# Patient Record
Sex: FEMALE | Race: WHITE | URBAN - METROPOLITAN AREA
[De-identification: names, ages, dates, MRNs, and addresses within clinical notes are randomized per-mention and may not be internally consistent; named-entity substitution may affect disease eponyms.]

---

## 2020-08-04 ENCOUNTER — OFFICE (OUTPATIENT)
Dept: URBAN - METROPOLITAN AREA CLINIC 45 | Facility: CLINIC | Age: 80
End: 2020-08-04

## 2020-08-04 VITALS — HEIGHT: 66 IN | WEIGHT: 145 LBS

## 2020-08-04 DIAGNOSIS — M75.50 BURSITIS OF UNSPECIFIED SHOULDER: ICD-10-CM

## 2020-08-04 DIAGNOSIS — Z86.010 SURVEILLANCE DUE TO PRIOR COLONIC NEOPLASIA: ICD-10-CM

## 2020-08-04 DIAGNOSIS — R14.0 ABDOMINAL BLOATING AND/OR GAS: ICD-10-CM

## 2020-08-04 PROCEDURE — 99213 OFFICE O/P EST LOW 20 MIN: CPT | Performed by: INTERNAL MEDICINE

## 2020-08-04 PROCEDURE — G0406 INPT/TELE FOLLOW UP 15: HCPCS | Performed by: INTERNAL MEDICINE

## 2020-08-04 NOTE — SERVICEHPINOTES
The patient presents for telemedicine visit to discuss surveillance colonoscopy. Patient occasionally experiences heartburn and bloating suggestive of excessive intestinal gas. Bloating/gas is relieved by passing flatus and relaxation techniques. Alarm symptoms reported: none..There has been 2 previous colon screenings, most recently in August 2015, previously in early 2012, adenomatous polyps were found and removed. The patient has no family history of colon cancer or other significant GI diseases. Patient denies fever, nausea, vomiting, dysphagia, reflux, abdominal pain, rectal bleeding, melena, and significant change in weight. Denies shortness of breath and chest pain.

## 2020-08-07 ENCOUNTER — AMBULATORY SURGICAL CENTER (OUTPATIENT)
Dept: URBAN - METROPOLITAN AREA SURGERY 28 | Facility: SURGERY | Age: 80
End: 2020-08-07

## 2020-08-07 VITALS
RESPIRATION RATE: 17 BRPM | DIASTOLIC BLOOD PRESSURE: 79 MMHG | OXYGEN SATURATION: 98 % | HEIGHT: 66 IN | WEIGHT: 145 LBS | SYSTOLIC BLOOD PRESSURE: 136 MMHG | HEART RATE: 80 BPM | TEMPERATURE: 97.7 F

## 2020-08-07 DIAGNOSIS — K57.30 DVRTCLOS OF LG INT W/O PERFORATION OR ABSCESS W/O BLEEDING: ICD-10-CM

## 2020-08-07 DIAGNOSIS — R14.0 ABDOMINAL DISTENSION (GASEOUS): ICD-10-CM

## 2020-08-07 PROBLEM — R10.30 ABDOMINAL PAIN, OTHER OR MULTIPLE SITES: Status: ACTIVE | Noted: 2020-08-07

## 2020-08-07 PROCEDURE — 45378 DIAGNOSTIC COLONOSCOPY: CPT | Performed by: INTERNAL MEDICINE

## 2020-08-07 NOTE — SERVICEHPINOTES
The patient presents for surveillance colonoscopy. Patient occasionally experiences heartburn, abdominal pain and bloating suggestive of excessive intestinal gas. There has been 2 previous colon screenings, most recently in August 2015, previously in early 2012, adenomatous polyps were found and removed. The patient has no family history of colon cancer or other significant GI diseases. Patient denies fever, nausea, vomiting, dysphagia, reflux, abdominal pain, rectal bleeding, melena, and significant change in weight. Denies shortness of breath and chest pain.

## 2020-08-11 ENCOUNTER — OFFICE (OUTPATIENT)
Dept: URBAN - METROPOLITAN AREA CLINIC 45 | Facility: CLINIC | Age: 80
End: 2020-08-11

## 2020-08-11 VITALS — HEIGHT: 66 IN | WEIGHT: 144 LBS

## 2020-08-11 DIAGNOSIS — K57.30 DVRTCLOS OF LG INT W/O PERFORATION OR ABSCESS W/O BLEEDING: ICD-10-CM

## 2020-08-11 DIAGNOSIS — Z86.010 SURVEILLANCE DUE TO PRIOR COLONIC NEOPLASIA: ICD-10-CM

## 2020-08-11 PROCEDURE — 99212 OFFICE O/P EST SF 10 MIN: CPT | Performed by: INTERNAL MEDICINE

## 2020-08-11 PROCEDURE — G0406 INPT/TELE FOLLOW UP 15: HCPCS | Performed by: INTERNAL MEDICINE

## 2020-08-11 NOTE — SERVICEHPINOTES
The patient presents for telemedicine follow-up after recent surveillance colonoscopy. Patient occasionally experiences heartburn, abdominal pain and bloating suggestive of excessive intestinal gas. Colonoscopy demonstrated mild sigmoid colon diverticulosis, otherwise was unremarkable. There has been 2 previous colon screenings, most recently in August 2015, previously in early 2012, adenomatous polyps were found and removed. The patient has no family history of colon cancer or other significant GI diseases. Patient denies fever, nausea, vomiting, dysphagia, reflux, abdominal pain, rectal bleeding, melena, and significant change in weight. Denies shortness of breath and chest pain.

## 2021-11-18 ENCOUNTER — HOSPITAL ENCOUNTER (OUTPATIENT)
Dept: HOSPITAL 54 - MSC | Age: 81
Discharge: HOME | End: 2021-11-18
Attending: INTERNAL MEDICINE
Payer: MEDICARE

## 2021-11-18 DIAGNOSIS — M25.512: ICD-10-CM

## 2021-11-18 DIAGNOSIS — E55.9: ICD-10-CM

## 2021-11-18 DIAGNOSIS — H26.9: ICD-10-CM

## 2021-11-18 DIAGNOSIS — A04.8: ICD-10-CM

## 2021-11-18 DIAGNOSIS — T75.3XXA: ICD-10-CM

## 2021-11-18 DIAGNOSIS — E78.5: ICD-10-CM

## 2021-11-18 DIAGNOSIS — K21.9: ICD-10-CM

## 2021-11-18 DIAGNOSIS — Z01.818: Primary | ICD-10-CM

## 2021-11-18 DIAGNOSIS — Z86.19: ICD-10-CM

## 2021-11-18 DIAGNOSIS — M67.439: ICD-10-CM

## 2021-11-18 DIAGNOSIS — M85.80: ICD-10-CM

## 2021-11-18 DIAGNOSIS — M72.2: ICD-10-CM

## 2021-11-18 LAB
ALBUMIN SERPL BCP-MCNC: 3.8 G/DL (ref 3.4–5)
ALP SERPL-CCNC: 113 U/L (ref 46–116)
ALT SERPL W P-5'-P-CCNC: 31 U/L (ref 12–78)
AST SERPL W P-5'-P-CCNC: 23 U/L (ref 15–37)
BASOPHILS # BLD AUTO: 0 K/UL (ref 0–0.2)
BASOPHILS NFR BLD AUTO: 0.8 % (ref 0–2)
BILIRUB SERPL-MCNC: 0.6 MG/DL (ref 0.2–1)
BILIRUB UR QL STRIP: NEGATIVE
BUN SERPL-MCNC: 20 MG/DL (ref 7–18)
CALCIUM SERPL-MCNC: 9.3 MG/DL (ref 8.5–10.1)
CHLORIDE SERPL-SCNC: 107 MMOL/L (ref 98–107)
CHOLEST SERPL-MCNC: 173 MG/DL (ref ?–200)
CO2 SERPL-SCNC: 28 MMOL/L (ref 21–32)
COLOR UR: YELLOW
CREAT SERPL-MCNC: 0.8 MG/DL (ref 0.6–1.3)
CRP SERPL-MCNC: < 0.2 MG/DL (ref 0–0.9)
EOSINOPHIL NFR BLD AUTO: 5 % (ref 0–6)
GLUCOSE SERPL-MCNC: 87 MG/DL (ref 74–106)
GLUCOSE UR STRIP-MCNC: NEGATIVE MG/DL
HCT VFR BLD AUTO: 44 % (ref 33–45)
HDLC SERPL-MCNC: 74 MG/DL (ref 40–60)
HGB BLD-MCNC: 14.7 G/DL (ref 11.5–14.8)
LDLC SERPL DIRECT ASSAY-MCNC: 79 MG/DL (ref 0–99)
LEUKOCYTE ESTERASE UR QL STRIP: NEGATIVE
LYMPHOCYTES NFR BLD AUTO: 2 K/UL (ref 0.8–4.8)
LYMPHOCYTES NFR BLD AUTO: 36 % (ref 20–44)
MAGNESIUM SERPL-MCNC: 2.4 MG/DL (ref 1.8–2.4)
MCHC RBC AUTO-ENTMCNC: 34 G/DL (ref 31–36)
MCV RBC AUTO: 92 FL (ref 82–100)
MONOCYTES NFR BLD AUTO: 0.5 K/UL (ref 0.1–1.3)
MONOCYTES NFR BLD AUTO: 8.9 % (ref 2–12)
NEUTROPHILS # BLD AUTO: 2.8 K/UL (ref 1.8–8.9)
NEUTROPHILS NFR BLD AUTO: 49.3 % (ref 43–81)
NITRITE UR QL STRIP: NEGATIVE
PH UR STRIP: 5.5 [PH] (ref 5–8)
PHOSPHATE SERPL-MCNC: 3.4 MG/DL (ref 2.5–4.9)
PLATELET # BLD AUTO: 220 K/UL (ref 150–450)
POTASSIUM SERPL-SCNC: 4.3 MMOL/L (ref 3.5–5.1)
PROT SERPL-MCNC: 7.8 G/DL (ref 6.4–8.2)
PROT UR QL STRIP: NEGATIVE MG/DL
PROT UR-MCNC: 7.8 MG/DL (ref 0–11.9)
RBC # BLD AUTO: 4.74 MIL/UL (ref 4–5.2)
RBC #/AREA URNS HPF: (no result) /HPF (ref 0–2)
SODIUM SERPL-SCNC: 144 MMOL/L (ref 136–145)
TRIGL SERPL-MCNC: 99 MG/DL (ref 30–150)
TSH SERPL DL<=0.005 MIU/L-ACNC: 2.18 UIU/ML (ref 0.36–3.74)
UROBILINOGEN UR STRIP-MCNC: 0.2 EU/DL
WBC #/AREA URNS HPF: (no result) /HPF (ref 0–3)
WBC NRBC COR # BLD AUTO: 5.6 K/UL (ref 4.3–11)

## 2021-11-18 PROCEDURE — 82607 VITAMIN B-12: CPT

## 2021-11-18 PROCEDURE — 84100 ASSAY OF PHOSPHORUS: CPT

## 2021-11-18 PROCEDURE — 82570 ASSAY OF URINE CREATININE: CPT

## 2021-11-18 PROCEDURE — 85652 RBC SED RATE AUTOMATED: CPT

## 2021-11-18 PROCEDURE — 82043 UR ALBUMIN QUANTITATIVE: CPT

## 2021-11-18 PROCEDURE — 86140 C-REACTIVE PROTEIN: CPT

## 2021-11-18 PROCEDURE — 83036 HEMOGLOBIN GLYCOSYLATED A1C: CPT

## 2021-11-18 PROCEDURE — 80053 COMPREHEN METABOLIC PANEL: CPT

## 2021-11-18 PROCEDURE — 93005 ELECTROCARDIOGRAM TRACING: CPT

## 2021-11-18 PROCEDURE — 85025 COMPLETE CBC W/AUTO DIFF WBC: CPT

## 2021-11-18 PROCEDURE — 84443 ASSAY THYROID STIM HORMONE: CPT

## 2021-11-18 PROCEDURE — 80061 LIPID PANEL: CPT

## 2021-11-18 PROCEDURE — 82746 ASSAY OF FOLIC ACID SERUM: CPT

## 2021-11-18 PROCEDURE — 36415 COLL VENOUS BLD VENIPUNCTURE: CPT

## 2021-11-18 PROCEDURE — 84439 ASSAY OF FREE THYROXINE: CPT

## 2021-11-18 PROCEDURE — 81001 URINALYSIS AUTO W/SCOPE: CPT

## 2021-11-18 PROCEDURE — 84155 ASSAY OF PROTEIN SERUM: CPT

## 2021-11-18 PROCEDURE — 83735 ASSAY OF MAGNESIUM: CPT

## 2021-11-23 ENCOUNTER — HOSPITAL ENCOUNTER (OUTPATIENT)
Dept: HOSPITAL 54 - MSC | Age: 81
Discharge: HOME | End: 2021-11-23
Attending: INTERNAL MEDICINE
Payer: MEDICARE

## 2021-11-23 DIAGNOSIS — M72.2: ICD-10-CM

## 2021-11-23 DIAGNOSIS — E78.5: ICD-10-CM

## 2021-11-23 DIAGNOSIS — E55.9: ICD-10-CM

## 2021-11-23 DIAGNOSIS — K21.9: ICD-10-CM

## 2021-11-23 DIAGNOSIS — M85.80: ICD-10-CM

## 2021-11-23 DIAGNOSIS — A04.8: ICD-10-CM

## 2021-11-23 DIAGNOSIS — H26.9: Primary | ICD-10-CM

## 2021-11-23 DIAGNOSIS — M67.439: ICD-10-CM

## 2021-11-23 DIAGNOSIS — M25.512: ICD-10-CM

## 2021-11-23 DIAGNOSIS — Z86.19: ICD-10-CM

## 2021-11-23 DIAGNOSIS — T75.3XXD: ICD-10-CM

## 2022-02-25 ENCOUNTER — HOSPITAL ENCOUNTER (OUTPATIENT)
Dept: HOSPITAL 54 - LAB | Age: 82
Discharge: HOME | End: 2022-02-25
Attending: INTERNAL MEDICINE
Payer: MEDICARE

## 2022-02-25 DIAGNOSIS — I44.4: ICD-10-CM

## 2022-02-25 DIAGNOSIS — Z01.818: Primary | ICD-10-CM

## 2022-02-25 DIAGNOSIS — M47.814: ICD-10-CM

## 2022-02-25 LAB
BASOPHILS # BLD AUTO: 0 K/UL (ref 0–0.2)
BASOPHILS NFR BLD AUTO: 0.1 % (ref 0–2)
EOSINOPHIL NFR BLD AUTO: 7.4 % (ref 0–6)
HCT VFR BLD AUTO: 41 % (ref 33–45)
HGB BLD-MCNC: 13.7 G/DL (ref 11.5–14.8)
LYMPHOCYTES NFR BLD AUTO: 1.8 K/UL (ref 0.8–4.8)
LYMPHOCYTES NFR BLD AUTO: 37.6 % (ref 20–44)
MCHC RBC AUTO-ENTMCNC: 33 G/DL (ref 31–36)
MCV RBC AUTO: 89 FL (ref 82–100)
MONOCYTES NFR BLD AUTO: 0.5 K/UL (ref 0.1–1.3)
MONOCYTES NFR BLD AUTO: 11.6 % (ref 2–12)
NEUTROPHILS # BLD AUTO: 2.1 K/UL (ref 1.8–8.9)
NEUTROPHILS NFR BLD AUTO: 43.3 % (ref 43–81)
PLATELET # BLD AUTO: 208 K/UL (ref 150–450)
RBC # BLD AUTO: 4.63 MIL/UL (ref 4–5.2)
WBC NRBC COR # BLD AUTO: 4.7 K/UL (ref 4.3–11)

## 2022-03-03 ENCOUNTER — HOSPITAL ENCOUNTER (OUTPATIENT)
Dept: HOSPITAL 54 - MSC | Age: 82
Discharge: HOME | End: 2022-03-03
Attending: INTERNAL MEDICINE
Payer: MEDICARE

## 2022-03-03 DIAGNOSIS — M67.439: ICD-10-CM

## 2022-03-03 DIAGNOSIS — Z86.19: ICD-10-CM

## 2022-03-03 DIAGNOSIS — Z01.818: Primary | ICD-10-CM

## 2022-03-03 DIAGNOSIS — M72.2: ICD-10-CM

## 2022-03-03 DIAGNOSIS — M85.80: ICD-10-CM

## 2022-03-03 DIAGNOSIS — E55.9: ICD-10-CM

## 2022-03-03 DIAGNOSIS — K21.9: ICD-10-CM

## 2022-03-03 DIAGNOSIS — H26.9: ICD-10-CM

## 2022-03-03 DIAGNOSIS — T75.3XXD: ICD-10-CM

## 2022-03-03 DIAGNOSIS — A04.8: ICD-10-CM

## 2022-03-03 DIAGNOSIS — E78.5: ICD-10-CM

## 2022-03-03 DIAGNOSIS — M25.512: ICD-10-CM

## 2022-06-15 ENCOUNTER — HOSPITAL ENCOUNTER (OUTPATIENT)
Dept: HOSPITAL 54 - MSC | Age: 82
Discharge: HOME | End: 2022-06-15
Attending: INTERNAL MEDICINE
Payer: MEDICARE

## 2022-06-15 DIAGNOSIS — E78.5: ICD-10-CM

## 2022-06-15 DIAGNOSIS — A04.8: ICD-10-CM

## 2022-06-15 DIAGNOSIS — M67.439: ICD-10-CM

## 2022-06-15 DIAGNOSIS — K21.9: ICD-10-CM

## 2022-06-15 DIAGNOSIS — M25.512: ICD-10-CM

## 2022-06-15 DIAGNOSIS — M72.2: ICD-10-CM

## 2022-06-15 DIAGNOSIS — A91: ICD-10-CM

## 2022-06-15 DIAGNOSIS — M81.0: Primary | ICD-10-CM

## 2022-06-15 DIAGNOSIS — T75.3XXD: ICD-10-CM

## 2022-06-15 DIAGNOSIS — E55.9: ICD-10-CM

## 2022-06-15 DIAGNOSIS — M85.80: ICD-10-CM

## 2023-02-23 ENCOUNTER — HOSPITAL ENCOUNTER (OUTPATIENT)
Dept: HOSPITAL 54 - MSC | Age: 83
Discharge: HOME | End: 2023-02-23
Attending: INTERNAL MEDICINE
Payer: MEDICARE

## 2023-02-23 DIAGNOSIS — M25.512: ICD-10-CM

## 2023-02-23 DIAGNOSIS — M25.559: ICD-10-CM

## 2023-02-23 DIAGNOSIS — M72.2: Primary | ICD-10-CM

## 2023-02-23 DIAGNOSIS — Z79.899: ICD-10-CM

## 2023-02-23 DIAGNOSIS — M67.439: ICD-10-CM

## 2023-02-23 DIAGNOSIS — E78.5: ICD-10-CM

## 2023-02-23 DIAGNOSIS — M81.0: ICD-10-CM

## 2023-02-23 DIAGNOSIS — A04.8: ICD-10-CM

## 2023-02-23 DIAGNOSIS — E55.9: ICD-10-CM

## 2023-02-23 DIAGNOSIS — A91: ICD-10-CM

## 2023-02-23 DIAGNOSIS — T75.3XXD: ICD-10-CM

## 2023-02-23 DIAGNOSIS — M85.80: ICD-10-CM

## 2023-02-23 DIAGNOSIS — Z79.1: ICD-10-CM

## 2023-02-23 LAB
ALBUMIN SERPL BCP-MCNC: 3.8 G/DL (ref 3.4–5)
ALP SERPL-CCNC: 113 U/L (ref 46–116)
ALT SERPL W P-5'-P-CCNC: 29 U/L (ref 12–78)
AST SERPL W P-5'-P-CCNC: 20 U/L (ref 15–37)
BASOPHILS # BLD AUTO: 0.1 K/UL (ref 0–0.2)
BASOPHILS NFR BLD AUTO: 2.9 % (ref 0–2)
BILIRUB SERPL-MCNC: 0.6 MG/DL (ref 0.2–1)
BILIRUB UR QL STRIP: NEGATIVE
BUN SERPL-MCNC: 19 MG/DL (ref 7–18)
CALCIUM SERPL-MCNC: 9.3 MG/DL (ref 8.5–10.1)
CHLORIDE SERPL-SCNC: 107 MMOL/L (ref 98–107)
CHOLEST SERPL-MCNC: 287 MG/DL (ref ?–200)
CO2 SERPL-SCNC: 28 MMOL/L (ref 21–32)
COLOR UR: YELLOW
CREAT SERPL-MCNC: 0.8 MG/DL (ref 0.6–1.3)
CRP SERPL-MCNC: 0.2 MG/DL (ref 0–0.9)
EOSINOPHIL NFR BLD AUTO: 5.9 % (ref 0–6)
FERRITIN SERPL-MCNC: 62 NG/ML (ref 8–388)
GLUCOSE SERPL-MCNC: 100 MG/DL (ref 74–106)
GLUCOSE UR STRIP-MCNC: NEGATIVE MG/DL
HCT VFR BLD AUTO: 45 % (ref 33–45)
HDLC SERPL-MCNC: 74 MG/DL (ref 40–60)
HGB BLD-MCNC: 14.9 G/DL (ref 11.5–14.8)
IRON SERPL-MCNC: 101 UG/DL (ref 50–175)
LDLC SERPL DIRECT ASSAY-MCNC: 192 MG/DL (ref 0–99)
LEUKOCYTE ESTERASE UR QL STRIP: NEGATIVE
LYMPHOCYTES NFR BLD AUTO: 1.4 K/UL (ref 0.8–4.8)
LYMPHOCYTES NFR BLD AUTO: 33 % (ref 20–44)
MAGNESIUM SERPL-MCNC: 2.3 MG/DL (ref 1.8–2.4)
MCHC RBC AUTO-ENTMCNC: 33 G/DL (ref 31–36)
MCV RBC AUTO: 91 FL (ref 82–100)
MONOCYTES NFR BLD AUTO: 0.4 K/UL (ref 0.1–1.3)
MONOCYTES NFR BLD AUTO: 10.7 % (ref 2–12)
NEUTROPHILS # BLD AUTO: 2 K/UL (ref 1.8–8.9)
NEUTROPHILS NFR BLD AUTO: 47.5 % (ref 43–81)
NITRITE UR QL STRIP: NEGATIVE
PH UR STRIP: 5.5 [PH] (ref 5–8)
PHOSPHATE SERPL-MCNC: 3.2 MG/DL (ref 2.5–4.9)
PLATELET # BLD AUTO: 226 K/UL (ref 150–450)
POTASSIUM SERPL-SCNC: 4.2 MMOL/L (ref 3.5–5.1)
PROT SERPL-MCNC: 7.3 G/DL (ref 6.4–8.2)
PROT UR QL STRIP: NEGATIVE MG/DL
PROT UR-MCNC: 8.6 MG/DL (ref 0–11.9)
RBC # BLD AUTO: 4.97 MIL/UL (ref 4–5.2)
RBC #/AREA URNS HPF: (no result) /HPF (ref 0–2)
SODIUM SERPL-SCNC: 141 MMOL/L (ref 136–145)
TIBC SERPL-MCNC: 322 UG/DL (ref 250–450)
TRIGL SERPL-MCNC: 105 MG/DL (ref 30–150)
TSH SERPL DL<=0.005 MIU/L-ACNC: 1.88 UIU/ML (ref 0.36–3.74)
UROBILINOGEN UR STRIP-MCNC: 0.2 EU/DL
WBC #/AREA URNS HPF: (no result) /HPF (ref 0–3)
WBC NRBC COR # BLD AUTO: 4.2 K/UL (ref 4.3–11)

## 2023-02-23 PROCEDURE — 82728 ASSAY OF FERRITIN: CPT

## 2023-02-23 PROCEDURE — 83036 HEMOGLOBIN GLYCOSYLATED A1C: CPT

## 2023-02-23 PROCEDURE — 36415 COLL VENOUS BLD VENIPUNCTURE: CPT

## 2023-02-23 PROCEDURE — 86140 C-REACTIVE PROTEIN: CPT

## 2023-02-23 PROCEDURE — 87086 URINE CULTURE/COLONY COUNT: CPT

## 2023-02-23 PROCEDURE — 84443 ASSAY THYROID STIM HORMONE: CPT

## 2023-02-23 PROCEDURE — 82306 VITAMIN D 25 HYDROXY: CPT

## 2023-02-23 PROCEDURE — 80061 LIPID PANEL: CPT

## 2023-02-23 PROCEDURE — 82746 ASSAY OF FOLIC ACID SERUM: CPT

## 2023-02-23 PROCEDURE — 85652 RBC SED RATE AUTOMATED: CPT

## 2023-02-23 PROCEDURE — 82570 ASSAY OF URINE CREATININE: CPT

## 2023-02-23 PROCEDURE — 82607 VITAMIN B-12: CPT

## 2023-02-23 PROCEDURE — 80053 COMPREHEN METABOLIC PANEL: CPT

## 2023-02-23 PROCEDURE — 84439 ASSAY OF FREE THYROXINE: CPT

## 2023-02-23 PROCEDURE — 84100 ASSAY OF PHOSPHORUS: CPT

## 2023-02-23 PROCEDURE — 84155 ASSAY OF PROTEIN SERUM: CPT

## 2023-02-23 PROCEDURE — 85025 COMPLETE CBC W/AUTO DIFF WBC: CPT

## 2023-02-23 PROCEDURE — 83540 ASSAY OF IRON: CPT

## 2023-02-23 PROCEDURE — 81001 URINALYSIS AUTO W/SCOPE: CPT

## 2023-02-23 PROCEDURE — 82043 UR ALBUMIN QUANTITATIVE: CPT

## 2023-02-23 PROCEDURE — 83735 ASSAY OF MAGNESIUM: CPT

## 2023-03-02 ENCOUNTER — HOSPITAL ENCOUNTER (OUTPATIENT)
Dept: HOSPITAL 54 - MSC | Age: 83
Discharge: HOME | End: 2023-03-02
Attending: INTERNAL MEDICINE
Payer: MEDICARE

## 2023-03-02 DIAGNOSIS — A04.8: ICD-10-CM

## 2023-03-02 DIAGNOSIS — Z79.1: ICD-10-CM

## 2023-03-02 DIAGNOSIS — E78.5: Primary | ICD-10-CM

## 2023-03-02 DIAGNOSIS — Z79.899: ICD-10-CM

## 2023-03-02 DIAGNOSIS — M25.512: ICD-10-CM

## 2023-03-02 DIAGNOSIS — M72.2: ICD-10-CM

## 2023-03-02 DIAGNOSIS — M81.0: ICD-10-CM

## 2023-03-02 DIAGNOSIS — M85.80: ICD-10-CM

## 2023-03-02 DIAGNOSIS — M67.439: ICD-10-CM

## 2023-03-02 DIAGNOSIS — T75.3XXD: ICD-10-CM

## 2023-03-02 DIAGNOSIS — I25.10: ICD-10-CM

## 2023-03-02 DIAGNOSIS — A91: ICD-10-CM

## 2023-03-02 DIAGNOSIS — M25.559: ICD-10-CM

## 2023-03-02 DIAGNOSIS — E55.9: ICD-10-CM

## 2023-03-23 ENCOUNTER — HOSPITAL ENCOUNTER (OUTPATIENT)
Dept: HOSPITAL 54 - CT | Age: 83
Discharge: HOME | End: 2023-03-23
Attending: INTERNAL MEDICINE
Payer: MEDICARE

## 2023-03-23 DIAGNOSIS — M81.0: ICD-10-CM

## 2023-03-23 DIAGNOSIS — T75.3XXD: ICD-10-CM

## 2023-03-23 DIAGNOSIS — I25.10: ICD-10-CM

## 2023-03-23 DIAGNOSIS — A04.8: ICD-10-CM

## 2023-03-23 DIAGNOSIS — Z79.1: ICD-10-CM

## 2023-03-23 DIAGNOSIS — M25.512: ICD-10-CM

## 2023-03-23 DIAGNOSIS — M67.439: ICD-10-CM

## 2023-03-23 DIAGNOSIS — M85.80: ICD-10-CM

## 2023-03-23 DIAGNOSIS — E55.9: ICD-10-CM

## 2023-03-23 DIAGNOSIS — A91: ICD-10-CM

## 2023-03-23 DIAGNOSIS — M72.2: ICD-10-CM

## 2023-03-23 DIAGNOSIS — E78.5: Primary | ICD-10-CM

## 2023-03-23 DIAGNOSIS — M25.559: ICD-10-CM

## 2024-09-12 ENCOUNTER — HOSPITAL ENCOUNTER (OUTPATIENT)
Dept: HOSPITAL 54 - MSC | Age: 84
Discharge: HOME | End: 2024-09-12
Attending: INTERNAL MEDICINE
Payer: MEDICARE

## 2024-09-12 DIAGNOSIS — E78.5: Primary | ICD-10-CM

## 2024-09-12 DIAGNOSIS — E55.9: ICD-10-CM

## 2024-09-12 DIAGNOSIS — Z79.899: ICD-10-CM

## 2024-09-12 DIAGNOSIS — M67.439: ICD-10-CM

## 2024-09-12 DIAGNOSIS — A04.8: ICD-10-CM

## 2024-09-12 DIAGNOSIS — R92.30: ICD-10-CM

## 2024-09-12 DIAGNOSIS — A91: ICD-10-CM

## 2024-09-12 DIAGNOSIS — T75.3XXD: ICD-10-CM

## 2024-09-12 DIAGNOSIS — I25.10: ICD-10-CM

## 2024-09-12 DIAGNOSIS — M81.0: ICD-10-CM

## 2024-09-12 DIAGNOSIS — M85.80: ICD-10-CM

## 2024-09-12 DIAGNOSIS — D72.819: ICD-10-CM

## 2024-09-12 LAB
ALBUMIN SERPL BCP-MCNC: 3.7 G/DL (ref 3.4–5)
ALP SERPL-CCNC: 122 U/L (ref 46–116)
ALT SERPL W P-5'-P-CCNC: 25 U/L (ref 12–78)
APPEARANCE UR: CLEAR
AST SERPL W P-5'-P-CCNC: 19 U/L (ref 15–37)
BACTERIA UR CULT: NO
BASOPHILS # BLD AUTO: 0.1 K/UL (ref 0–0.2)
BASOPHILS NFR BLD AUTO: 1.4 % (ref 0–2)
BILIRUB SERPL-MCNC: 0.6 MG/DL (ref 0.2–1)
BILIRUB UR QL STRIP: NEGATIVE
BUN SERPL-MCNC: 18 MG/DL (ref 7–18)
CALCIUM SERPL-MCNC: 9.2 MG/DL (ref 8.5–10.1)
CHLORIDE SERPL-SCNC: 106 MMOL/L (ref 98–107)
CHOLEST SERPL-MCNC: 236 MG/DL (ref ?–200)
CO2 SERPL-SCNC: 25 MMOL/L (ref 21–32)
COLOR UR: YELLOW
CREAT SERPL-MCNC: 0.7 MG/DL (ref 0.6–1.3)
CRP SERPL-MCNC: < 0.2 MG/DL (ref 0–0.3)
EOSINOPHIL # BLD AUTO: 0.3 K/UL (ref 0–0.7)
EOSINOPHIL NFR BLD AUTO: 6.9 % (ref 0–6)
ERYTHROCYTE [DISTWIDTH] IN BLOOD BY AUTOMATED COUNT: 14.4 % (ref 11.5–15)
ERYTHROCYTE [SEDIMENTATION RATE] IN BLOOD BY WESTERGREN METHOD: 20 MM/HR (ref 0–30)
GLUCOSE SERPL-MCNC: 86 MG/DL (ref 74–106)
GLUCOSE UR STRIP-MCNC: NEGATIVE MG/DL
HCT VFR BLD AUTO: 44 % (ref 33–45)
HDLC SERPL-MCNC: 60 MG/DL (ref 40–60)
HGB BLD-MCNC: 14.4 G/DL (ref 11.5–14.8)
HGB UR QL STRIP: (no result) ERY/UL
KETONES UR STRIP-MCNC: NEGATIVE MG/DL
LDLC SERPL DIRECT ASSAY-MCNC: 160 MG/DL (ref 0–99)
LEUKOCYTE ESTERASE UR QL STRIP: (no result)
LYMPHOCYTES NFR BLD AUTO: 1.7 K/UL (ref 0.8–4.8)
LYMPHOCYTES NFR BLD AUTO: 35.3 % (ref 20–44)
MAGNESIUM SERPL-MCNC: 2.3 MG/DL (ref 1.8–2.4)
MCH RBC QN AUTO: 30 PG (ref 26–33)
MCHC RBC AUTO-ENTMCNC: 33 G/DL (ref 31–36)
MCV RBC AUTO: 90 FL (ref 82–100)
MONOCYTES NFR BLD AUTO: 0.6 K/UL (ref 0.1–1.3)
MONOCYTES NFR BLD AUTO: 11.5 % (ref 2–12)
NEUTROPHILS # BLD AUTO: 2.2 K/UL (ref 1.8–8.9)
NEUTROPHILS NFR BLD AUTO: 44.9 % (ref 43–81)
NITRITE UR QL STRIP: NEGATIVE
PH UR STRIP: 6 [PH] (ref 5–8)
PHOSPHATE SERPL-MCNC: 3 MG/DL (ref 2.5–4.9)
PLATELET # BLD AUTO: 239 K/UL (ref 150–450)
POTASSIUM SERPL-SCNC: 4 MMOL/L (ref 3.5–5.1)
PROT SERPL-MCNC: 7.2 G/DL (ref 6.4–8.2)
PROT UR QL STRIP: NEGATIVE MG/DL
PROT UR-MCNC: < 6 MG/DL (ref 0–11.9)
RBC # BLD AUTO: 4.82 MIL/UL (ref 4–5.2)
RBC #/AREA URNS HPF: (no result) /HPF (ref 0–2)
SODIUM SERPL-SCNC: 140 MMOL/L (ref 136–145)
SP GR UR STRIP: <1.005 (ref 1–1.03)
TRIGL SERPL-MCNC: 98 MG/DL (ref 30–150)
TSH SERPL DL<=0.005 MIU/L-ACNC: 2.32 UIU/ML (ref 0.36–3.74)
UROBILINOGEN UR STRIP-MCNC: 0.2 EU/DL
VIT B12 SERPL-MCNC: 1712 PG/ML (ref 193–986)
WBC #/AREA URNS HPF: (no result) /HPF (ref 0–3)
WBC NRBC COR # BLD AUTO: 4.9 K/UL (ref 4.3–11)

## 2024-09-12 PROCEDURE — 84439 ASSAY OF FREE THYROXINE: CPT

## 2024-09-12 PROCEDURE — 84443 ASSAY THYROID STIM HORMONE: CPT

## 2024-09-12 PROCEDURE — 80053 COMPREHEN METABOLIC PANEL: CPT

## 2024-09-12 PROCEDURE — 85652 RBC SED RATE AUTOMATED: CPT

## 2024-09-12 PROCEDURE — 83036 HEMOGLOBIN GLYCOSYLATED A1C: CPT

## 2024-09-12 PROCEDURE — 80061 LIPID PANEL: CPT

## 2024-09-12 PROCEDURE — 82306 VITAMIN D 25 HYDROXY: CPT

## 2024-09-12 PROCEDURE — 82607 VITAMIN B-12: CPT

## 2024-09-12 PROCEDURE — 82746 ASSAY OF FOLIC ACID SERUM: CPT

## 2024-09-12 PROCEDURE — 84100 ASSAY OF PHOSPHORUS: CPT

## 2024-09-12 PROCEDURE — 83735 ASSAY OF MAGNESIUM: CPT

## 2024-09-12 PROCEDURE — 81001 URINALYSIS AUTO W/SCOPE: CPT

## 2024-09-12 PROCEDURE — 85025 COMPLETE CBC W/AUTO DIFF WBC: CPT

## 2024-09-12 PROCEDURE — 84156 ASSAY OF PROTEIN URINE: CPT

## 2024-09-12 PROCEDURE — 86140 C-REACTIVE PROTEIN: CPT

## 2024-09-13 LAB
25(OH)D3 SERPL-MCNC: 49.5 NG/ML (ref 30–100)
FOLATE SERPL-MCNC: 10.8 NG/ML (ref 3–?)
MICROALBUMIN UR-MCNC: < 3 UG/ML

## 2025-07-30 ENCOUNTER — OFFICE (OUTPATIENT)
Dept: URBAN - METROPOLITAN AREA CLINIC 45 | Facility: CLINIC | Age: 85
End: 2025-07-30

## 2025-07-30 VITALS
SYSTOLIC BLOOD PRESSURE: 115 MMHG | WEIGHT: 157 LBS | DIASTOLIC BLOOD PRESSURE: 67 MMHG | HEART RATE: 111 BPM | HEIGHT: 66 IN

## 2025-07-30 DIAGNOSIS — Z86.0101 PERSONAL HISTORY OF ADENOMATOUS AND SERRATED COLON POLYPS: ICD-10-CM

## 2025-07-30 DIAGNOSIS — R14.0: ICD-10-CM

## 2025-07-30 DIAGNOSIS — K57.30 DVRTCLOS OF LG INT W/O PERFORATION OR ABSCESS W/O BLEEDING: ICD-10-CM

## 2025-07-30 PROCEDURE — 99203 OFFICE O/P NEW LOW 30 MIN: CPT | Performed by: INTERNAL MEDICINE

## 2025-07-30 NOTE — SERVICEHPINOTES
The patient presents office evaluation prior to recommend surveillance colonoscopy. Patient occasionally experiences constipation and bloating. There has been 2 previous colon screenings: in 2015 and 2012, adenomatous polyps were found and removed. The patient has no family history of colon cancer or other significant GI diseases. Patient denies fever, nausea, vomiting, dysphagia, reflux, abdominal pain, rectal bleeding, melena, and significant change in weight. Denies shortness of breath and chest pain.

## 2025-08-14 ENCOUNTER — AMBULATORY SURGICAL CENTER (OUTPATIENT)
Dept: URBAN - METROPOLITAN AREA SURGERY 28 | Facility: SURGERY | Age: 85
End: 2025-08-14

## 2025-08-14 VITALS
RESPIRATION RATE: 16 BRPM | WEIGHT: 157 LBS | DIASTOLIC BLOOD PRESSURE: 65 MMHG | HEIGHT: 66 IN | HEART RATE: 86 BPM | SYSTOLIC BLOOD PRESSURE: 119 MMHG | OXYGEN SATURATION: 97 % | TEMPERATURE: 97.2 F

## 2025-08-14 DIAGNOSIS — Z86.0101 PERSONAL HISTORY OF ADENOMATOUS AND SERRATED COLON POLYPS: ICD-10-CM

## 2025-08-14 DIAGNOSIS — K62.1 RECTAL POLYP: ICD-10-CM

## 2025-08-14 DIAGNOSIS — R14.0 ABDOMINAL DISTENSION (GASEOUS): ICD-10-CM

## 2025-08-14 DIAGNOSIS — K57.30 DIVERTICULOSIS OF LARGE INTESTINE WITHOUT PERFORATION OR ABS: ICD-10-CM

## 2025-08-14 PROBLEM — K63.5 POLYP OF COLON: Status: ACTIVE | Noted: 2025-08-14

## 2025-08-14 PROCEDURE — 45385 COLONOSCOPY W/LESION REMOVAL: CPT | Performed by: INTERNAL MEDICINE

## 2025-08-14 PROCEDURE — 45380 COLONOSCOPY AND BIOPSY: CPT | Mod: 59 | Performed by: INTERNAL MEDICINE

## 2025-08-18 LAB — GI HISTOLOGY: A: (no result)
